# Patient Record
Sex: FEMALE | Race: BLACK OR AFRICAN AMERICAN | URBAN - METROPOLITAN AREA
[De-identification: names, ages, dates, MRNs, and addresses within clinical notes are randomized per-mention and may not be internally consistent; named-entity substitution may affect disease eponyms.]

---

## 2017-12-05 ENCOUNTER — TELEPHONE (OUTPATIENT)
Dept: FAMILY MEDICINE CLINIC | Age: 37
End: 2017-12-05

## 2017-12-05 NOTE — TELEPHONE ENCOUNTER
----- Message from Aletha Kong sent at 12/5/2017 12:53 PM EST -----  Contact: Corie  This patient is looking to become a new pt of Bedelia Buerger. Her mom is a patient of terence. Sharyle Silva . cb patient at  health insurance is [employer] .

## 2018-11-14 ENCOUNTER — OFFICE VISIT (OUTPATIENT)
Dept: PRIMARY CARE CLINIC | Age: 38
End: 2018-11-14

## 2018-11-14 VITALS
DIASTOLIC BLOOD PRESSURE: 86 MMHG | HEIGHT: 62 IN | WEIGHT: 222 LBS | SYSTOLIC BLOOD PRESSURE: 132 MMHG | BODY MASS INDEX: 40.85 KG/M2 | TEMPERATURE: 97.3 F

## 2018-11-14 DIAGNOSIS — R53.83 FATIGUE, UNSPECIFIED TYPE: ICD-10-CM

## 2018-11-14 DIAGNOSIS — R73.09 ELEVATED HEMOGLOBIN A1C: ICD-10-CM

## 2018-11-14 DIAGNOSIS — D72.829 LEUKOCYTOSIS, UNSPECIFIED TYPE: ICD-10-CM

## 2018-11-14 DIAGNOSIS — Z00.00 PHYSICAL EXAM: ICD-10-CM

## 2018-11-14 DIAGNOSIS — E55.9 VITAMIN D DEFICIENCY: ICD-10-CM

## 2018-11-14 DIAGNOSIS — Z23 NEEDS FLU SHOT: ICD-10-CM

## 2018-11-14 DIAGNOSIS — Z00.00 PHYSICAL EXAM: Primary | ICD-10-CM

## 2018-11-14 DIAGNOSIS — E03.9 HYPOTHYROIDISM, UNSPECIFIED TYPE: ICD-10-CM

## 2018-11-14 PROBLEM — E78.5 HYPERLIPIDEMIA: Status: ACTIVE | Noted: 2018-11-14

## 2018-11-14 LAB
A/G RATIO: 1.6 (ref 1.1–2.2)
ALBUMIN SERPL-MCNC: 4.7 G/DL (ref 3.4–5)
ALP BLD-CCNC: 89 U/L (ref 40–129)
ALT SERPL-CCNC: 14 U/L (ref 10–40)
ANION GAP SERPL CALCULATED.3IONS-SCNC: 14 MMOL/L (ref 3–16)
AST SERPL-CCNC: 13 U/L (ref 15–37)
BACTERIA: ABNORMAL /HPF
BASOPHILS ABSOLUTE: 0 K/UL (ref 0–0.2)
BASOPHILS RELATIVE PERCENT: 0.2 %
BILIRUB SERPL-MCNC: <0.2 MG/DL (ref 0–1)
BILIRUBIN URINE: NEGATIVE
BLOOD, URINE: NEGATIVE
BUN BLDV-MCNC: 7 MG/DL (ref 7–20)
CALCIUM SERPL-MCNC: 9.6 MG/DL (ref 8.3–10.6)
CHLORIDE BLD-SCNC: 102 MMOL/L (ref 99–110)
CHOLESTEROL, TOTAL: 203 MG/DL (ref 0–199)
CLARITY: ABNORMAL
CO2: 25 MMOL/L (ref 21–32)
COLOR: YELLOW
CREAT SERPL-MCNC: <0.5 MG/DL (ref 0.6–1.1)
EOSINOPHILS ABSOLUTE: 0.1 K/UL (ref 0–0.6)
EOSINOPHILS RELATIVE PERCENT: 1.2 %
EPITHELIAL CELLS, UA: 7 /HPF (ref 0–5)
FOLATE: 7.99 NG/ML (ref 4.78–24.2)
GFR AFRICAN AMERICAN: >60
GFR NON-AFRICAN AMERICAN: >60
GLOBULIN: 3 G/DL
GLUCOSE BLD-MCNC: 100 MG/DL (ref 70–99)
GLUCOSE URINE: NEGATIVE MG/DL
HCT VFR BLD CALC: 36.5 % (ref 36–48)
HDLC SERPL-MCNC: 37 MG/DL (ref 40–60)
HEMOGLOBIN: 11.5 G/DL (ref 12–16)
HYALINE CASTS: 2 /LPF (ref 0–8)
KETONES, URINE: NEGATIVE MG/DL
LDL CHOLESTEROL CALCULATED: 147 MG/DL
LEUKOCYTE ESTERASE, URINE: NEGATIVE
LYMPHOCYTES ABSOLUTE: 2.7 K/UL (ref 1–5.1)
LYMPHOCYTES RELATIVE PERCENT: 26.2 %
MCH RBC QN AUTO: 26 PG (ref 26–34)
MCHC RBC AUTO-ENTMCNC: 31.4 G/DL (ref 31–36)
MCV RBC AUTO: 82.6 FL (ref 80–100)
MICROSCOPIC EXAMINATION: YES
MONOCYTES ABSOLUTE: 0.7 K/UL (ref 0–1.3)
MONOCYTES RELATIVE PERCENT: 7.3 %
NEUTROPHILS ABSOLUTE: 6.6 K/UL (ref 1.7–7.7)
NEUTROPHILS RELATIVE PERCENT: 65.1 %
NITRITE, URINE: NEGATIVE
PDW BLD-RTO: 18 % (ref 12.4–15.4)
PH UA: 5.5
PLATELET # BLD: 486 K/UL (ref 135–450)
PMV BLD AUTO: 8 FL (ref 5–10.5)
POTASSIUM SERPL-SCNC: 4 MMOL/L (ref 3.5–5.1)
PROTEIN UA: NEGATIVE MG/DL
RBC # BLD: 4.41 M/UL (ref 4–5.2)
RBC UA: 1 /HPF (ref 0–4)
SODIUM BLD-SCNC: 141 MMOL/L (ref 136–145)
SPECIFIC GRAVITY UA: 1.03
T4 FREE: 1 NG/DL (ref 0.9–1.8)
TOTAL PROTEIN: 7.7 G/DL (ref 6.4–8.2)
TRIGL SERPL-MCNC: 96 MG/DL (ref 0–150)
TSH SERPL DL<=0.05 MIU/L-ACNC: 1.53 UIU/ML (ref 0.27–4.2)
URINE TYPE: ABNORMAL
UROBILINOGEN, URINE: 0.2 E.U./DL
VITAMIN B-12: 505 PG/ML (ref 211–911)
VLDLC SERPL CALC-MCNC: 19 MG/DL
WBC # BLD: 10.1 K/UL (ref 4–11)
WBC UA: 2 /HPF (ref 0–5)

## 2018-11-14 PROCEDURE — 90686 IIV4 VACC NO PRSV 0.5 ML IM: CPT | Performed by: INTERNAL MEDICINE

## 2018-11-14 PROCEDURE — 99203 OFFICE O/P NEW LOW 30 MIN: CPT | Performed by: INTERNAL MEDICINE

## 2018-11-14 PROCEDURE — 90471 IMMUNIZATION ADMIN: CPT | Performed by: INTERNAL MEDICINE

## 2018-11-14 RX ORDER — AZITHROMYCIN 250 MG/1
TABLET, FILM COATED ORAL
Qty: 6 TABLET | Refills: 0 | Status: SHIPPED | OUTPATIENT
Start: 2018-11-14 | End: 2018-11-24

## 2018-11-14 ASSESSMENT — ENCOUNTER SYMPTOMS
EYE DISCHARGE: 0
RESPIRATORY NEGATIVE: 1
EYES NEGATIVE: 1

## 2018-11-14 NOTE — PATIENT INSTRUCTIONS
Continue your current medications. See me in one month. Finish the antibiotic. Lab review. Flu shot today.

## 2018-11-15 LAB
ESTIMATED AVERAGE GLUCOSE: 128.4 MG/DL
HBA1C MFR BLD: 6.1 %

## 2018-11-18 LAB
VITAMIN D2 AND D3, TOTAL: 17.4 NG/ML (ref 30–80)
VITAMIN D2, 25 HYDROXY: 6.5 NG/ML
VITAMIN D3,25 HYDROXY: 10.9 NG/ML

## 2018-12-12 ENCOUNTER — OFFICE VISIT (OUTPATIENT)
Dept: PRIMARY CARE CLINIC | Age: 38
End: 2018-12-12

## 2018-12-12 VITALS
HEART RATE: 77 BPM | BODY MASS INDEX: 41.22 KG/M2 | TEMPERATURE: 98 F | HEIGHT: 62 IN | SYSTOLIC BLOOD PRESSURE: 121 MMHG | DIASTOLIC BLOOD PRESSURE: 74 MMHG | WEIGHT: 224 LBS | RESPIRATION RATE: 16 BRPM | OXYGEN SATURATION: 99 %
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
## 2018-12-12 DIAGNOSIS — R73.09 ELEVATED HEMOGLOBIN A1C: ICD-10-CM

## 2018-12-12 DIAGNOSIS — E55.9 VITAMIN D DEFICIENCY: Primary | ICD-10-CM

## 2018-12-12 DIAGNOSIS — E78.5 HYPERLIPIDEMIA, UNSPECIFIED HYPERLIPIDEMIA TYPE: ICD-10-CM

## 2018-12-12 PROCEDURE — 99214 OFFICE O/P EST MOD 30 MIN: CPT | Performed by: INTERNAL MEDICINE

## 2018-12-12 RX ORDER — ERGOCALCIFEROL 1.25 MG/1
50000 CAPSULE ORAL WEEKLY
Qty: 30 CAPSULE | Refills: 0 | Status: SHIPPED | OUTPATIENT
Start: 2018-12-12 | End: 2020-02-04 | Stop reason: SDUPTHER

## 2018-12-12 ASSESSMENT — PATIENT HEALTH QUESTIONNAIRE - PHQ9
1. LITTLE INTEREST OR PLEASURE IN DOING THINGS: 0
SUM OF ALL RESPONSES TO PHQ QUESTIONS 1-9: 0
SUM OF ALL RESPONSES TO PHQ9 QUESTIONS 1 & 2: 0
SUM OF ALL RESPONSES TO PHQ QUESTIONS 1-9: 0
2. FEELING DOWN, DEPRESSED OR HOPELESS: 0

## 2018-12-12 ASSESSMENT — ENCOUNTER SYMPTOMS
EYE DISCHARGE: 0
EYES NEGATIVE: 1
RESPIRATORY NEGATIVE: 1

## 2018-12-12 NOTE — PROGRESS NOTES
disease include dyslipidemia and diabetes mellitus. She is compliant with treatment all of the time. Her weight is decreasing steadily. Meal planning includes avoidance of concentrated sweets and carbohydrate counting. She participates in exercise intermittently. An ACE inhibitor/angiotensin II receptor blocker is not being taken. She does not see a podiatrist.Eye exam is not current. Allergies   Allergen Reactions    Latex Rash     Outpatient Prescriptions Marked as Taking for the 12/12/18 encounter (Office Visit) with Reymundo Mcclelland MD   Medication Sig Dispense Refill    vitamin D (ERGOCALCIFEROL) 38461 units CAPS capsule Take 1 capsule by mouth once a week 30 capsule 0    Omeprazole Magnesium (PRILOSEC OTC PO) Take  by mouth.  Multiple Vitamins-Minerals (THERAPEUTIC MULTIVITAMIN-MINERALS) tablet Take 1 tablet by mouth daily.  Fish Oil OIL by Does not apply route. Immunization History   Administered Date(s) Administered    Influenza, Quadv, 3 yrs and older, IM, PF (Fluzone 3 yrs and older or Afluria 5 yrs and older) 11/14/2018    Tdap (Boostrix, Adacel) 11/14/2013       History reviewed. No pertinent past medical history. Past Surgical History:   Procedure Laterality Date    TUBAL LIGATION  2007     Family History   Problem Relation Age of Onset    High Blood Pressure Mother     Diabetes Paternal Aunt     Diabetes Paternal Uncle     Heart Disease Maternal Grandmother     Heart Disease Maternal Grandfather     Diabetes Paternal Grandfather        Review of Systems:  Review of Systems   Constitutional: Negative for activity change and appetite change. HENT: Negative. Eyes: Negative. Negative for discharge. Respiratory: Negative. Genitourinary: Negative for difficulty urinating. Musculoskeletal: Negative for arthralgias. Skin: Negative for rash. Neurological: Negative. Psychiatric/Behavioral: Negative. Negative for agitation and confusion.  The patient

## 2018-12-12 NOTE — PATIENT INSTRUCTIONS
Continue your current medications and see me again in 3 months. Please have your labs repeated before the next visit. Continue Ibuprofen for the left knee pain. Lipid panel in 3 months. Vitamin D level in 3 months. Repeat the A1c in 3 months.

## 2018-12-14 PROBLEM — Z00.00 PHYSICAL EXAM: Status: RESOLVED | Noted: 2018-11-14 | Resolved: 2018-12-14

## 2020-01-15 ENCOUNTER — OFFICE VISIT (OUTPATIENT)
Dept: PRIMARY CARE CLINIC | Age: 40
End: 2020-01-15
Payer: COMMERCIAL

## 2020-01-15 VITALS
HEART RATE: 78 BPM | TEMPERATURE: 98.2 F | WEIGHT: 232 LBS | HEIGHT: 62 IN | BODY MASS INDEX: 42.69 KG/M2 | SYSTOLIC BLOOD PRESSURE: 135 MMHG | OXYGEN SATURATION: 98 % | DIASTOLIC BLOOD PRESSURE: 84 MMHG

## 2020-01-15 PROBLEM — Z23 NEED FOR SHINGLES VACCINE: Status: ACTIVE | Noted: 2020-01-15

## 2020-01-15 LAB
A/G RATIO: 1.4 (ref 1.1–2.2)
ALBUMIN SERPL-MCNC: 4.3 G/DL (ref 3.4–5)
ALP BLD-CCNC: 87 U/L (ref 40–129)
ALT SERPL-CCNC: 18 U/L (ref 10–40)
ANION GAP SERPL CALCULATED.3IONS-SCNC: 16 MMOL/L (ref 3–16)
AST SERPL-CCNC: 17 U/L (ref 15–37)
BASOPHILS ABSOLUTE: 0.1 K/UL (ref 0–0.2)
BASOPHILS RELATIVE PERCENT: 1.2 %
BILIRUB SERPL-MCNC: <0.2 MG/DL (ref 0–1)
BUN BLDV-MCNC: 8 MG/DL (ref 7–20)
CALCIUM SERPL-MCNC: 9.5 MG/DL (ref 8.3–10.6)
CHLORIDE BLD-SCNC: 102 MMOL/L (ref 99–110)
CHOLESTEROL, TOTAL: 208 MG/DL (ref 0–199)
CO2: 24 MMOL/L (ref 21–32)
CREAT SERPL-MCNC: <0.5 MG/DL (ref 0.6–1.1)
EOSINOPHILS ABSOLUTE: 0.1 K/UL (ref 0–0.6)
EOSINOPHILS RELATIVE PERCENT: 1.7 %
GFR AFRICAN AMERICAN: >60
GFR NON-AFRICAN AMERICAN: >60
GLOBULIN: 3 G/DL
GLUCOSE BLD-MCNC: 101 MG/DL (ref 70–99)
HCT VFR BLD CALC: 37.4 % (ref 36–48)
HDLC SERPL-MCNC: 37 MG/DL (ref 40–60)
HEMOGLOBIN: 12 G/DL (ref 12–16)
LDL CHOLESTEROL CALCULATED: 139 MG/DL
LYMPHOCYTES ABSOLUTE: 2.6 K/UL (ref 1–5.1)
LYMPHOCYTES RELATIVE PERCENT: 29.4 %
MCH RBC QN AUTO: 26.7 PG (ref 26–34)
MCHC RBC AUTO-ENTMCNC: 32 G/DL (ref 31–36)
MCV RBC AUTO: 83.3 FL (ref 80–100)
MONOCYTES ABSOLUTE: 0.7 K/UL (ref 0–1.3)
MONOCYTES RELATIVE PERCENT: 8.2 %
NEUTROPHILS ABSOLUTE: 5.2 K/UL (ref 1.7–7.7)
NEUTROPHILS RELATIVE PERCENT: 59.5 %
PDW BLD-RTO: 16.7 % (ref 12.4–15.4)
PLATELET # BLD: 458 K/UL (ref 135–450)
PMV BLD AUTO: 7.9 FL (ref 5–10.5)
POTASSIUM SERPL-SCNC: 4.4 MMOL/L (ref 3.5–5.1)
RBC # BLD: 4.49 M/UL (ref 4–5.2)
SODIUM BLD-SCNC: 142 MMOL/L (ref 136–145)
TOTAL PROTEIN: 7.3 G/DL (ref 6.4–8.2)
TRIGL SERPL-MCNC: 162 MG/DL (ref 0–150)
TSH SERPL DL<=0.05 MIU/L-ACNC: 1.58 UIU/ML (ref 0.27–4.2)
VITAMIN D 25-HYDROXY: 19.1 NG/ML
VLDLC SERPL CALC-MCNC: 32 MG/DL
WBC # BLD: 8.8 K/UL (ref 4–11)

## 2020-01-15 PROCEDURE — 99214 OFFICE O/P EST MOD 30 MIN: CPT | Performed by: INTERNAL MEDICINE

## 2020-01-15 ASSESSMENT — PATIENT HEALTH QUESTIONNAIRE - PHQ9
2. FEELING DOWN, DEPRESSED OR HOPELESS: 0
1. LITTLE INTEREST OR PLEASURE IN DOING THINGS: 0
SUM OF ALL RESPONSES TO PHQ9 QUESTIONS 1 & 2: 0
SUM OF ALL RESPONSES TO PHQ QUESTIONS 1-9: 0
SUM OF ALL RESPONSES TO PHQ QUESTIONS 1-9: 0

## 2020-01-15 ASSESSMENT — ENCOUNTER SYMPTOMS
GASTROINTESTINAL NEGATIVE: 1
RESPIRATORY NEGATIVE: 1
EYES NEGATIVE: 1

## 2020-01-15 NOTE — PATIENT INSTRUCTIONS
Lab review. Start on a diet. Get some exercise. Continue your current medications. Patient Education        DASH Diet: Care Instructions  Your Care Instructions    The DASH diet is an eating plan that can help lower your blood pressure. DASH stands for Dietary Approaches to Stop Hypertension. Hypertension is high blood pressure. The DASH diet focuses on eating foods that are high in calcium, potassium, and magnesium. These nutrients can lower blood pressure. The foods that are highest in these nutrients are fruits, vegetables, low-fat dairy products, nuts, seeds, and legumes. But taking calcium, potassium, and magnesium supplements instead of eating foods that are high in those nutrients does not have the same effect. The DASH diet also includes whole grains, fish, and poultry. The DASH diet is one of several lifestyle changes your doctor may recommend to lower your high blood pressure. Your doctor may also want you to decrease the amount of sodium in your diet. Lowering sodium while following the DASH diet can lower blood pressure even further than just the DASH diet alone. Follow-up care is a key part of your treatment and safety. Be sure to make and go to all appointments, and call your doctor if you are having problems. It's also a good idea to know your test results and keep a list of the medicines you take. How can you care for yourself at home? Following the DASH diet  · Eat 4 to 5 servings of fruit each day. A serving is 1 medium-sized piece of fruit, ½ cup chopped or canned fruit, 1/4 cup dried fruit, or 4 ounces (½ cup) of fruit juice. Choose fruit more often than fruit juice. · Eat 4 to 5 servings of vegetables each day. A serving is 1 cup of lettuce or raw leafy vegetables, ½ cup of chopped or cooked vegetables, or 4 ounces (½ cup) of vegetable juice. Choose vegetables more often than vegetable juice. · Get 2 to 3 servings of low-fat and fat-free dairy each day.  A serving is 8 ounces of milk, 1 adding chopped walnuts or almonds to cooked vegetables. ? Try some vegetarian meals using beans and peas. Add garbanzo or kidney beans to salads. Make burritos and tacos with mashed cao beans or black beans. Where can you learn more? Go to https://chpepiceweb.healthInLive Interactive. org and sign in to your World Blender account. Enter I959 in the [employer] box to learn more about \"DASH Diet: Care Instructions. \"     If you do not have an account, please click on the \"Sign Up Now\" link. Current as of: April 9, 2019  Content Version: 12.3  © 7981-4723 [employer]. Care instructions adapted under license by [facility] ([facility]). If you have questions about a medical condition or this instruction, always ask your healthcare professional. [place] [address] any warranty or liability for your use of this information. Patient Education        Learning About Physical Activity  What is physical activity? Physical activity is any kind of activity that gets your body moving. The types of physical activity that can help you get fit and stay healthy include:  · Aerobic or \"cardio\" activities that make your heart beat faster and make you breathe harder, such as brisk walking, riding a bike, or running. Aerobic activities strengthen your heart and lungs and build up your endurance. · Strength training activities that make your muscles work against, or \"resist,\" something, such as lifting weights or doing push-ups. These activities help tone and strengthen your muscles. · Stretches that allow you to move your joints and muscles through their full range of motion. Stretching helps you be more flexible and avoid injury. What are the benefits of physical activity? Being active is one of the best things you can do to get fit and stay healthy. It helps you to:  · Feel stronger and have more energy to do all the things you like to do.   · Focus better at school or work and perform better in flexible, loosen tight muscles, and avoid injury. It can also help improve your balance and posture and can be a great way to relax. Be sure to stretch the muscles you'll be using when you work out. It's best to warm your muscles slightly before you stretch them. Walk or do some other light aerobic activity for a few minutes, and then start stretching. When you stretch your muscles:  · Do it slowly. Stretching is not about going fast or making sudden movements. · Don't push or bounce during a stretch. · Hold each stretch for at least 15 to 30 seconds, if you can. You should feel a stretch in the muscle, but not pain. · Breathe out as you do the stretch. Then breathe in as you hold the stretch. Don't hold your breath. If you're worried about how more activity might affect your health, have a checkup before you start. Follow any special advice your doctor gives you for getting a smart start. Where can you learn more? Go to https://[employer] Capital.FlagTap. org and sign in to your Digital Performance account. Enter Y895 in the Videoflow box to learn more about \"Learning About Physical Activity. \"     If you do not have an account, please click on the \"Sign Up Now\" link. Current as of: May 5, 2019  Content Version: 12.3  © 3151-1129 [employer]wise[employer] Incorporated. Care instructions adapted under license by [facility] ([facility]). If you have questions about a medical condition or this instruction, always ask your healthcare professional. [place] [address] any warranty or liability for your use of this information.

## 2020-01-16 LAB
ESTIMATED AVERAGE GLUCOSE: 119.8 MG/DL
HBA1C MFR BLD: 5.8 %

## 2020-01-22 ENCOUNTER — TELEPHONE (OUTPATIENT)
Dept: PRIMARY CARE CLINIC | Age: 40
End: 2020-01-22

## 2020-02-04 RX ORDER — ATORVASTATIN CALCIUM 40 MG/1
40 TABLET, FILM COATED ORAL DAILY
Qty: 90 TABLET | Refills: 1 | Status: SHIPPED | OUTPATIENT
Start: 2020-02-04 | End: 2022-03-09 | Stop reason: SDUPTHER

## 2020-02-04 RX ORDER — ERGOCALCIFEROL 1.25 MG/1
CAPSULE ORAL
Qty: 60 CAPSULE | Refills: 2 | Status: SHIPPED | OUTPATIENT
Start: 2020-02-04 | End: 2022-10-26

## 2020-02-04 NOTE — TELEPHONE ENCOUNTER
The patient was called and given her lab results. The vitamin D was increased to 1 tablet twice a week because the patient states that she has been on the medication once a week for over a year and still has a very low vitamin D level. Her cholesterol is also elevated so I am going to start her on a atorvastatin 40 mg daily. Follow-up as previously planned and repeat vitamin D level and cholesterol in 3 months.
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
## 2022-03-09 ENCOUNTER — OFFICE VISIT (OUTPATIENT)
Dept: PRIMARY CARE CLINIC | Age: 42
End: 2022-03-09
Payer: COMMERCIAL

## 2022-03-09 ENCOUNTER — TELEPHONE (OUTPATIENT)
Dept: PRIMARY CARE CLINIC | Age: 42
End: 2022-03-09

## 2022-03-09 VITALS
HEART RATE: 71 BPM | HEIGHT: 62 IN | SYSTOLIC BLOOD PRESSURE: 115 MMHG | BODY MASS INDEX: 43.24 KG/M2 | WEIGHT: 235 LBS | TEMPERATURE: 97.3 F | DIASTOLIC BLOOD PRESSURE: 83 MMHG

## 2022-03-09 DIAGNOSIS — R73.09 ELEVATED HEMOGLOBIN A1C: ICD-10-CM

## 2022-03-09 DIAGNOSIS — E78.5 HYPERLIPIDEMIA, UNSPECIFIED HYPERLIPIDEMIA TYPE: ICD-10-CM

## 2022-03-09 DIAGNOSIS — R53.83 FATIGUE, UNSPECIFIED TYPE: ICD-10-CM

## 2022-03-09 DIAGNOSIS — E03.9 HYPOTHYROIDISM, UNSPECIFIED TYPE: ICD-10-CM

## 2022-03-09 DIAGNOSIS — Z12.4 SCREENING FOR CERVICAL CANCER: Primary | ICD-10-CM

## 2022-03-09 DIAGNOSIS — H00.011 HORDEOLUM EXTERNUM OF RIGHT UPPER EYELID: ICD-10-CM

## 2022-03-09 LAB
A/G RATIO: 1.3 (ref 1.1–2.2)
ALBUMIN SERPL-MCNC: 4.2 G/DL (ref 3.4–5)
ALP BLD-CCNC: 90 U/L (ref 40–129)
ALT SERPL-CCNC: 12 U/L (ref 10–40)
ANION GAP SERPL CALCULATED.3IONS-SCNC: 15 MMOL/L (ref 3–16)
AST SERPL-CCNC: 12 U/L (ref 15–37)
BILIRUB SERPL-MCNC: <0.2 MG/DL (ref 0–1)
BUN BLDV-MCNC: 7 MG/DL (ref 7–20)
CALCIUM SERPL-MCNC: 9.3 MG/DL (ref 8.3–10.6)
CHLORIDE BLD-SCNC: 102 MMOL/L (ref 99–110)
CHOLESTEROL, FASTING: 228 MG/DL (ref 0–199)
CO2: 22 MMOL/L (ref 21–32)
CREAT SERPL-MCNC: 0.6 MG/DL (ref 0.6–1.1)
GFR AFRICAN AMERICAN: >60
GFR NON-AFRICAN AMERICAN: >60
GLUCOSE BLD-MCNC: 105 MG/DL (ref 70–99)
HBA1C MFR BLD: 5.9 %
HDLC SERPL-MCNC: 38 MG/DL (ref 40–60)
LDL CHOLESTEROL CALCULATED: 162 MG/DL
POTASSIUM SERPL-SCNC: 4.3 MMOL/L (ref 3.5–5.1)
SODIUM BLD-SCNC: 139 MMOL/L (ref 136–145)
TOTAL PROTEIN: 7.4 G/DL (ref 6.4–8.2)
TRIGLYCERIDE, FASTING: 142 MG/DL (ref 0–150)
TSH REFLEX FT4: 1.89 UIU/ML (ref 0.27–4.2)
VLDLC SERPL CALC-MCNC: 28 MG/DL

## 2022-03-09 PROCEDURE — 99215 OFFICE O/P EST HI 40 MIN: CPT | Performed by: INTERNAL MEDICINE

## 2022-03-09 PROCEDURE — 83036 HEMOGLOBIN GLYCOSYLATED A1C: CPT | Performed by: INTERNAL MEDICINE

## 2022-03-09 RX ORDER — ATORVASTATIN CALCIUM 40 MG/1
40 TABLET, FILM COATED ORAL DAILY
Qty: 90 TABLET | Refills: 1 | Status: SHIPPED | OUTPATIENT
Start: 2022-03-09 | End: 2022-10-26 | Stop reason: SDUPTHER

## 2022-03-09 SDOH — ECONOMIC STABILITY: FOOD INSECURITY: WITHIN THE PAST 12 MONTHS, THE FOOD YOU BOUGHT JUST DIDN'T LAST AND YOU DIDN'T HAVE MONEY TO GET MORE.: NEVER TRUE

## 2022-03-09 SDOH — ECONOMIC STABILITY: FOOD INSECURITY: WITHIN THE PAST 12 MONTHS, YOU WORRIED THAT YOUR FOOD WOULD RUN OUT BEFORE YOU GOT MONEY TO BUY MORE.: NEVER TRUE

## 2022-03-09 ASSESSMENT — PATIENT HEALTH QUESTIONNAIRE - PHQ9
SUM OF ALL RESPONSES TO PHQ QUESTIONS 1-9: 0
2. FEELING DOWN, DEPRESSED OR HOPELESS: 0
SUM OF ALL RESPONSES TO PHQ QUESTIONS 1-9: 0
SUM OF ALL RESPONSES TO PHQ9 QUESTIONS 1 & 2: 0
1. LITTLE INTEREST OR PLEASURE IN DOING THINGS: 0
SUM OF ALL RESPONSES TO PHQ QUESTIONS 1-9: 0
SUM OF ALL RESPONSES TO PHQ QUESTIONS 1-9: 0

## 2022-03-09 ASSESSMENT — SOCIAL DETERMINANTS OF HEALTH (SDOH): HOW HARD IS IT FOR YOU TO PAY FOR THE VERY BASICS LIKE FOOD, HOUSING, MEDICAL CARE, AND HEATING?: NOT HARD AT ALL

## 2022-03-09 ASSESSMENT — ENCOUNTER SYMPTOMS
EYE DISCHARGE: 0
RESPIRATORY NEGATIVE: 1
EYES NEGATIVE: 1

## 2022-03-09 NOTE — TELEPHONE ENCOUNTER
Pt called in about concerns about her visit today  and she stated that her paper work stated that she is a diabetic she is concern because she not being treated for this Pt would like some clarity on this please contact Pt @[phone] please be advise

## 2022-03-09 NOTE — PATIENT INSTRUCTIONS
Your cholesterol was elevated the last time it was checked. Please go ahead and start on the atorvastatin to help lower your cholesterol. A prescription was sent to the pharmacy. Lab review to check your cholesterol and other lab values. Continue your current medications. Your A1c was very good today    I have included a referral to an ophthalmologist to check the stye on your right upper lid. You also have an appointment scheduled with the gynecologist.    I agree with your diet changes. Please continue with the diet that you have selected and continue weight watchers. See me again in 3 months.

## 2022-03-09 NOTE — PROGRESS NOTES
Napoleon Juares (:  1980) is a 37 y.o. female,Established patient, here for evaluation of the following chief complaint(s): Annual Exam         ASSESSMENT/PLAN:  1. Elevated hemoglobin A1c  Assessment & Plan:   Asymptomatic, continue current medications  Orders:  -     POCT glycosylated hemoglobin (Hb A1C)  2. Screening for cervical cancer  -     Debbie Mays MD, Gynecology, [address] [place]  3. Hypothyroidism, unspecified type  -     TSH with Reflex to FT4; Future  4. Hyperlipidemia, unspecified hyperlipidemia type  Assessment & Plan:   Uncontrolled, continue current medications  Orders:  -     Lipid, Fasting; Future  -     atorvastatin (LIPITOR) 40 MG tablet; Take 1 tablet by mouth daily, Disp-90 tablet, R-1Normal  -     Comprehensive Metabolic Panel; Future  5. Hordeolum externum of right upper eyelid  Assessment & Plan:  Her to ophthalmology because of cosmetic issues regarding the upper lid for delirium which is not bothering her. Orders:  -     Wilberto Lee MD, (Anterior Segment Reconstruction, Comprehensive and Surgical Ophthalmology) Ophthalmology, [place]  6. Fatigue, unspecified type  Assessment & Plan:   Well-controlled, continue current medications      Return in about 3 months (around 2022). Subjective   SUBJECTIVE/OBJECTIVE:  HPI    Review of Systems   Constitutional: Negative for activity change and appetite change. HENT: Negative. Eyes: Negative. Negative for discharge. Respiratory: Negative. Genitourinary: Negative for difficulty urinating. Musculoskeletal: Negative for arthralgias. Skin: Negative for rash. Neurological: Negative. Psychiatric/Behavioral: Negative. Negative for agitation and confusion. The patient is not nervous/anxious. All other systems reviewed and are negative. Objective   Physical Exam  Constitutional:       Appearance: She is well-developed. HENT:      Head: Normocephalic and atraumatic.    Eyes: Conjunctiva/sclera: Conjunctivae normal.      Pupils: Pupils are equal, round, and reactive to light. Cardiovascular:      Rate and Rhythm: Normal rate and regular rhythm. Heart sounds: Normal heart sounds. Pulmonary:      Effort: Pulmonary effort is normal.      Breath sounds: Normal breath sounds. Musculoskeletal:         General: Normal range of motion. Cervical back: Normal range of motion and neck supple. Skin:     General: Skin is warm and dry. Neurological:      Mental Status: She is alert and oriented to person, place, and time. Deep Tendon Reflexes: Reflexes are normal and symmetric. Hordeolum externum of right upper eyelid  Her to ophthalmology because of cosmetic issues regarding the upper lid for delirium which is not bothering her. Elevated hemoglobin A1c   Asymptomatic, continue current medications    Fatigue   Well-controlled, continue current medications    Hyperlipidemia   Uncontrolled, continue current medications       On this date 3/9/2022 I have spent 40 minutes reviewing previous notes, test results and face to face with the patient discussing the diagnosis and importance of compliance with the treatment plan as well as documenting on the day of the visit. An electronic signature was used to authenticate this note.     --Mazin Chaudhari MD

## 2022-03-09 NOTE — ASSESSMENT & PLAN NOTE
Her to ophthalmology because of cosmetic issues regarding the upper lid for delirium which is not bothering her.

## 2022-03-10 NOTE — TELEPHONE ENCOUNTER
You have an elevated hemoglobin A1c that goes back at least 2 years. That is being followed to see if you eventually develop diabetes. You do not have diabetes now.

## 2022-04-08 PROBLEM — Z12.4 SCREENING FOR CERVICAL CANCER: Status: RESOLVED | Noted: 2022-03-09 | Resolved: 2022-04-08

## 2022-05-26 PROBLEM — D06.9 CIN III WITH SEVERE DYSPLASIA: Status: ACTIVE | Noted: 2021-06-23

## 2022-10-26 ENCOUNTER — OFFICE VISIT (OUTPATIENT)
Dept: PRIMARY CARE CLINIC | Age: 42
End: 2022-10-26
Payer: COMMERCIAL

## 2022-10-26 ENCOUNTER — TELEPHONE (OUTPATIENT)
Dept: PRIMARY CARE CLINIC | Age: 42
End: 2022-10-26

## 2022-10-26 VITALS
DIASTOLIC BLOOD PRESSURE: 84 MMHG | SYSTOLIC BLOOD PRESSURE: 130 MMHG | HEART RATE: 86 BPM | WEIGHT: 244.9 LBS | BODY MASS INDEX: 44.79 KG/M2 | OXYGEN SATURATION: 98 %

## 2022-10-26 DIAGNOSIS — E78.5 HYPERLIPIDEMIA, UNSPECIFIED HYPERLIPIDEMIA TYPE: ICD-10-CM

## 2022-10-26 DIAGNOSIS — E55.9 VITAMIN D DEFICIENCY: Primary | ICD-10-CM

## 2022-10-26 DIAGNOSIS — E55.9 VITAMIN D DEFICIENCY: ICD-10-CM

## 2022-10-26 DIAGNOSIS — R73.03 PRE-DIABETES: ICD-10-CM

## 2022-10-26 DIAGNOSIS — H00.011 HORDEOLUM EXTERNUM OF RIGHT UPPER EYELID: ICD-10-CM

## 2022-10-26 DIAGNOSIS — R73.09 ELEVATED HEMOGLOBIN A1C: ICD-10-CM

## 2022-10-26 LAB
A/G RATIO: 1.4 (ref 1.1–2.2)
ALBUMIN SERPL-MCNC: 4.1 G/DL (ref 3.4–5)
ALP BLD-CCNC: 88 U/L (ref 40–129)
ALT SERPL-CCNC: 15 U/L (ref 10–40)
ANION GAP SERPL CALCULATED.3IONS-SCNC: 14 MMOL/L (ref 3–16)
AST SERPL-CCNC: 13 U/L (ref 15–37)
BASOPHILS ABSOLUTE: 0.1 K/UL (ref 0–0.2)
BASOPHILS RELATIVE PERCENT: 1 %
BILIRUB SERPL-MCNC: <0.2 MG/DL (ref 0–1)
BILIRUBIN URINE: NEGATIVE
BLOOD, URINE: NEGATIVE
BUN BLDV-MCNC: 7 MG/DL (ref 7–20)
CALCIUM SERPL-MCNC: 9.2 MG/DL (ref 8.3–10.6)
CHLORIDE BLD-SCNC: 101 MMOL/L (ref 99–110)
CHOLESTEROL, FASTING: 200 MG/DL (ref 0–199)
CLARITY: CLEAR
CO2: 22 MMOL/L (ref 21–32)
COLOR: YELLOW
CREAT SERPL-MCNC: 0.6 MG/DL (ref 0.6–1.1)
EOSINOPHILS ABSOLUTE: 0.2 K/UL (ref 0–0.6)
EOSINOPHILS RELATIVE PERCENT: 1.5 %
GFR SERPL CREATININE-BSD FRML MDRD: >60 ML/MIN/{1.73_M2}
GLUCOSE BLD-MCNC: 96 MG/DL (ref 70–99)
GLUCOSE URINE: NEGATIVE MG/DL
HCT VFR BLD CALC: 33.6 % (ref 36–48)
HDLC SERPL-MCNC: 39 MG/DL (ref 40–60)
HEMOGLOBIN: 10.7 G/DL (ref 12–16)
KETONES, URINE: NEGATIVE MG/DL
LDL CHOLESTEROL CALCULATED: 127 MG/DL
LEUKOCYTE ESTERASE, URINE: NEGATIVE
LYMPHOCYTES ABSOLUTE: 3.1 K/UL (ref 1–5.1)
LYMPHOCYTES RELATIVE PERCENT: 28.4 %
MCH RBC QN AUTO: 25.2 PG (ref 26–34)
MCHC RBC AUTO-ENTMCNC: 31.7 G/DL (ref 31–36)
MCV RBC AUTO: 79.3 FL (ref 80–100)
MICROSCOPIC EXAMINATION: NORMAL
MONOCYTES ABSOLUTE: 0.8 K/UL (ref 0–1.3)
MONOCYTES RELATIVE PERCENT: 7 %
NEUTROPHILS ABSOLUTE: 6.8 K/UL (ref 1.7–7.7)
NEUTROPHILS RELATIVE PERCENT: 62.1 %
NITRITE, URINE: NEGATIVE
PDW BLD-RTO: 16.9 % (ref 12.4–15.4)
PH UA: 5 (ref 5–8)
PLATELET # BLD: 469 K/UL (ref 135–450)
PMV BLD AUTO: 7.2 FL (ref 5–10.5)
POTASSIUM SERPL-SCNC: 4.1 MMOL/L (ref 3.5–5.1)
PROTEIN UA: NEGATIVE MG/DL
RBC # BLD: 4.24 M/UL (ref 4–5.2)
SODIUM BLD-SCNC: 137 MMOL/L (ref 136–145)
SPECIFIC GRAVITY UA: 1.02 (ref 1–1.03)
TOTAL PROTEIN: 7 G/DL (ref 6.4–8.2)
TRIGLYCERIDE, FASTING: 172 MG/DL (ref 0–150)
TSH REFLEX FT4: 1.76 UIU/ML (ref 0.27–4.2)
URINE TYPE: NORMAL
UROBILINOGEN, URINE: 0.2 E.U./DL
VITAMIN D 25-HYDROXY: 20.9 NG/ML
VLDLC SERPL CALC-MCNC: 34 MG/DL
WBC # BLD: 10.9 K/UL (ref 4–11)

## 2022-10-26 PROCEDURE — 99215 OFFICE O/P EST HI 40 MIN: CPT | Performed by: INTERNAL MEDICINE

## 2022-10-26 RX ORDER — ATORVASTATIN CALCIUM 40 MG/1
40 TABLET, FILM COATED ORAL DAILY
Qty: 90 TABLET | Refills: 1 | Status: SHIPPED | OUTPATIENT
Start: 2022-10-26

## 2022-10-26 RX ORDER — ERGOCALCIFEROL 1.25 MG/1
50000 CAPSULE ORAL WEEKLY
Qty: 12 CAPSULE | Refills: 4 | Status: SHIPPED | OUTPATIENT
Start: 2022-10-26 | End: 2023-10-27

## 2022-10-26 RX ORDER — TIRZEPATIDE 2.5 MG/.5ML
2.5 INJECTION, SOLUTION SUBCUTANEOUS WEEKLY
Qty: 1 ML | Refills: 0 | Status: SHIPPED | OUTPATIENT
Start: 2022-10-26 | End: 2022-11-09

## 2022-10-26 RX ORDER — SULFACETAMIDE SODIUM 100 MG/ML
2 SOLUTION/ DROPS OPHTHALMIC 4 TIMES DAILY
Qty: 10 ML | Refills: 0 | Status: SHIPPED | OUTPATIENT
Start: 2022-10-26 | End: 2022-11-05

## 2022-10-26 ASSESSMENT — ENCOUNTER SYMPTOMS
RESPIRATORY NEGATIVE: 1
EYES NEGATIVE: 1
GASTROINTESTINAL NEGATIVE: 1

## 2022-10-26 NOTE — TELEPHONE ENCOUNTER
Gi LEIVA/rep with [employer] stated that Tirzepatide [facility]) 2.5 MG/0.5ML SOPN SC injection will received 4 doses because it's how it is packaged.

## 2022-10-26 NOTE — PATIENT INSTRUCTIONS
Sulamyd antibiotic eyedrops have been prescribed for the upper lid right eye stye. The prescription has been sent to the pharmacy. Please use it as directed. Lab review as discussed to be done this morning in our lab. Mounjaro subcutaneously as directed once a week for the next 2 weeks. You may give yourself the first dose today. This is for prediabetes. I am also referring you to weight management to help with weight loss plans. Please resume a atorvastatin once a day. A prescription renewal has been sent to the pharmacy. Please see me again in 2 weeks.

## 2022-10-27 NOTE — PROGRESS NOTES
Concerned about possible diabetes as cause of recent symptoms. Can't lose weight and wants Mounjaro to help lose weight and for pre diabetes control    Abigail Paget  YOB: 1980    Date of Service:  10/26/2022    Chief Complaint   Patient presents with    Check-Up     HLD vitamin D def         Diabetes  She presents for her initial diabetic visit. She has type 2 diabetes mellitus. Her disease course has been stable. Symptoms are worsening. There are no diabetic complications. Risk factors for coronary artery disease include dyslipidemia, stress and obesity. When asked about current treatments, none were reported. Hyperlipidemia  This is a chronic problem. The current episode started more than 1 year ago. The problem is uncontrolled. Recent lipid tests were reviewed and are high. Allergies   Allergen Reactions    Latex Rash     Outpatient Medications Marked as Taking for the 10/26/22 encounter (Office Visit) with Jess Patterson MD   Medication Sig Dispense Refill    Tirzepatide [facility]) 2.5 MG/0.5ML SOPN SC injection Inject 0.5 mLs into the skin once a week for 14 days 1 mL 0    vitamin D (ERGOCALCIFEROL) 1.25 MG (01394 UT) CAPS capsule Take 1 capsule by mouth once a week 12 capsule 4    sulfacetamide (BLEPH-10) 10 % ophthalmic solution Place 2 drops into the right eye 4 times daily for 10 days 10 mL 0    atorvastatin (LIPITOR) 40 MG tablet Take 1 tablet by mouth daily 90 tablet 1    Multiple Vitamins-Minerals (THERAPEUTIC MULTIVITAMIN-MINERALS) tablet Take 1 tablet by mouth daily. Fish Oil OIL by Does not apply route         Immunization History   Administered Date(s) Administered    Influenza, FLUARIX, FLULAVAL, FLUZONE (age 10 mo+) AND AFLURIA, (age 1 y+), PF, 0.5mL 11/14/2018    Rubella 05/19/2008    Tdap (Boostrix, Adacel) 11/14/2013       No past medical history on file.   Past Surgical History:   Procedure Laterality Date    TUBAL LIGATION  2007     Family History   Problem Relation Age of Onset    High Blood Pressure Mother     Diabetes Paternal Aunt     Diabetes Paternal Uncle     Heart Disease Maternal Grandmother     Heart Disease Maternal Grandfather     Diabetes Paternal Grandfather        Review of Systems:  Review of Systems   Constitutional: Negative. HENT: Negative. Eyes: Negative. Respiratory: Negative. Cardiovascular: Negative. Gastrointestinal: Negative. Genitourinary: Negative. Musculoskeletal: Negative. Skin: Negative. Neurological: Negative. Psychiatric/Behavioral: Negative. Vitals:    10/26/22 0925   BP: 130/84   Pulse: 86   SpO2: 98%   Weight: 244 lb 14.4 oz (111.1 kg)     Body mass index is 44.79 kg/m². Wt Readings from Last 3 Encounters:   10/26/22 244 lb 14.4 oz (111.1 kg)   03/09/22 235 lb (106.6 kg)   01/15/20 232 lb (105.2 kg)     BP Readings from Last 3 Encounters:   10/26/22 130/84   03/09/22 115/83   01/15/20 135/84         Physical Exam  Constitutional:       Appearance: Normal appearance. She is well-developed. HENT:      Head: Normocephalic and atraumatic. Eyes:      Conjunctiva/sclera: Conjunctivae normal.      Pupils: Pupils are equal, round, and reactive to light. Neck:      Trachea: Trachea and phonation normal.        Comments: Enlarged right lobe of thyroid  Cardiovascular:      Rate and Rhythm: Normal rate and regular rhythm. Pulses: Normal pulses. Heart sounds: Normal heart sounds. Pulmonary:      Effort: Pulmonary effort is normal.      Breath sounds: Normal breath sounds. Abdominal:      General: Abdomen is flat. Palpations: Abdomen is soft. Musculoskeletal:         General: Normal range of motion. Cervical back: Normal range of motion and neck supple. Skin:     General: Skin is warm and dry. Neurological:      Mental Status: She is alert and oriented to person, place, and time. Deep Tendon Reflexes: Reflexes are normal and symmetric.        Lab Review   No visits with results within 6 Month(s) from this visit. Latest known visit with results is:   Orders Only on 03/09/2022   Component Date Value    Sodium 03/09/2022 139     Potassium 03/09/2022 4.3     Chloride 03/09/2022 102     CO2 03/09/2022 22     Anion Gap 03/09/2022 15     Glucose 03/09/2022 105 (A)     BUN 03/09/2022 7     Creatinine 03/09/2022 0.6     GFR Non- 03/09/2022 >60     GFR  03/09/2022 >60     Calcium 03/09/2022 9.3     Total Protein 03/09/2022 7.4     Albumin 03/09/2022 4.2     Albumin/Globulin Ratio 03/09/2022 1.3     Total Bilirubin 03/09/2022 <0.2     Alkaline Phosphatase 03/09/2022 90     ALT 03/09/2022 12     AST 03/09/2022 12 (A)     Cholesterol, Fasting 03/09/2022 228 (A)     Triglyceride, Fasting 03/09/2022 142     HDL 03/09/2022 38 (A)     LDL Calculated 03/09/2022 162 (A)     VLDL Cholesterol Calcula* 03/09/2022 28     TSH Reflex FT4 03/09/2022 9.72      notapplicable      Health Maintenance   Topic Date Due    COVID-19 Vaccine (1) Never done    Hepatitis C screen  Never done    Cervical cancer screen  Never done    Flu vaccine (1) 08/01/2022    A1C test (Diabetic or Prediabetic)  03/09/2023    Depression Screen  03/09/2023    Lipids  10/26/2023    DTaP/Tdap/Td vaccine (2 - Td or Tdap) 11/14/2023    Hepatitis A vaccine  Aged Out    Hib vaccine  Aged Out    Meningococcal (ACWY) vaccine  Aged Out    Pneumococcal 0-64 years Vaccine  Aged Out    Varicella vaccine  Discontinued    HIV screen  Discontinued          Assessment/Plan:    Hyperlipidemia  Lab review and medication review    1. Hyperlipidemia, unspecified hyperlipidemia type    - Lipid, Fasting; Future  - Comprehensive Metabolic Panel; Future  - TSH with Reflex to FT4; Future  - Urinalysis; Future  - CBC with Auto Differential; Future  - atorvastatin (LIPITOR) 40 MG tablet; Take 1 tablet by mouth daily  Dispense: 90 tablet; Refill: 1    2. Vitamin D deficiency    - Vitamin D 25 Hydroxy;  Future  - vitamin D (ERGOCALCIFEROL) 1.25 MG (34099 UT) CAPS capsule; Take 1 capsule by mouth once a week  Dispense: 12 capsule; Refill: 4    3. Elevated hemoglobin A1c    [facility]    4. Hordeolum externum of right upper eyelid    - sulfacetamide (BLEPH-10) 10 % ophthalmic solution; Place 2 drops into the right eye 4 times daily for 10 days  Dispense: 10 mL; Refill: 0    5. Pre-diabetes    - Tirzepatide (MOUNJARO) 2.5 MG/0.5ML SOPN SC injection; Inject 0.5 mLs into the skin once a week for 14 days  Dispense: 1 mL; Refill: 0   40 minutes were spent on this visit    Return in about 2 weeks (around 11/9/2022).

## 2022-10-27 NOTE — PROGRESS NOTES
[facility]  Call back for results or view in my chart

## 2022-10-31 ENCOUNTER — TELEPHONE (OUTPATIENT)
Dept: ADMINISTRATIVE | Age: 42
End: 2022-10-31

## 2022-10-31 NOTE — TELEPHONE ENCOUNTER
Submitted PA for [facility] 2.5MG/0.5ML pen-injectors. Key: [id number] Via [employer] STATUS: APPROVED 10/03/2022; Coverage End Date:11/02/2023. Will scan letter when received. If this requires a response please respond to the pool ( P [id number] [facility]). Thank you please advise patient.

## 2022-11-09 ENCOUNTER — OFFICE VISIT (OUTPATIENT)
Dept: PRIMARY CARE CLINIC | Age: 42
End: 2022-11-09
Payer: COMMERCIAL

## 2022-11-09 VITALS
BODY MASS INDEX: 44.72 KG/M2 | HEART RATE: 72 BPM | HEIGHT: 62 IN | DIASTOLIC BLOOD PRESSURE: 82 MMHG | WEIGHT: 243 LBS | SYSTOLIC BLOOD PRESSURE: 124 MMHG | TEMPERATURE: 98.2 F

## 2022-11-09 DIAGNOSIS — R73.09 ELEVATED HEMOGLOBIN A1C: Primary | ICD-10-CM

## 2022-11-09 PROCEDURE — 99213 OFFICE O/P EST LOW 20 MIN: CPT | Performed by: INTERNAL MEDICINE

## 2022-11-09 RX ORDER — TIRZEPATIDE 5 MG/.5ML
5 INJECTION, SOLUTION SUBCUTANEOUS WEEKLY
Qty: 4 ADJUSTABLE DOSE PRE-FILLED PEN SYRINGE | Refills: 1 | Status: SHIPPED | OUTPATIENT
Start: 2022-11-09 | End: 2022-12-09

## 2022-11-09 ASSESSMENT — ENCOUNTER SYMPTOMS
EYES NEGATIVE: 1
EYE DISCHARGE: 0
RESPIRATORY NEGATIVE: 1

## 2022-11-09 NOTE — PROGRESS NOTES
Doing well on medication and has had some weight loss . Increase dose and check weight in a month. Sandeep Dominique  YOB: 1980    Date of Service:  11/9/2022    Chief Complaint   Patient presents with    Follow-up         Diabetes  She presents for her follow-up diabetic visit. She has type 2 diabetes mellitus. Her disease course has been stable. Pertinent negatives for hypoglycemia include no confusion or nervousness/anxiousness. There are no diabetic associated symptoms. Allergies   Allergen Reactions    Latex Rash     Outpatient Medications Marked as Taking for the 11/9/22 encounter (Office Visit) with Wali Shah MD   Medication Sig Dispense Refill    Tirzepatide [facility]) 5 MG/0.5ML SOPN SC injection Inject 0.5 mLs into the skin once a week 4 Adjustable Dose Pre-filled Pen Syringe 1    vitamin D (ERGOCALCIFEROL) 1.25 MG (08171 UT) CAPS capsule Take 1 capsule by mouth once a week 12 capsule 4    atorvastatin (LIPITOR) 40 MG tablet Take 1 tablet by mouth daily 90 tablet 1    Multiple Vitamins-Minerals (THERAPEUTIC MULTIVITAMIN-MINERALS) tablet Take 1 tablet by mouth daily. Fish Oil OIL by Does not apply route         Immunization History   Administered Date(s) Administered    Influenza, FLUARIX, FLULAVAL, FLUZONE (age 10 mo+) AND AFLURIA, (age 1 y+), PF, 0.5mL 11/14/2018    Rubella 05/19/2008    Tdap (Boostrix, Adacel) 11/14/2013       No past medical history on file. Past Surgical History:   Procedure Laterality Date    TUBAL LIGATION  2007     Family History   Problem Relation Age of Onset    High Blood Pressure Mother     Diabetes Paternal Aunt     Diabetes Paternal Uncle     Heart Disease Maternal Grandmother     Heart Disease Maternal Grandfather     Diabetes Paternal Grandfather        Review of Systems:  Review of Systems   Constitutional:  Negative for activity change and appetite change. HENT: Negative. Eyes: Negative. Negative for discharge.    Respiratory: Negative. Genitourinary:  Negative for difficulty urinating. Musculoskeletal:  Negative for arthralgias. Skin:  Negative for rash. Neurological: Negative. Psychiatric/Behavioral: Negative. Negative for agitation and confusion. The patient is not nervous/anxious. All other systems reviewed and are negative. Vitals:    11/09/22 0928   BP: 124/82   Pulse: 72   Temp: 98.2 °F (36.8 °C)   TempSrc: Temporal   Weight: 243 lb (110.2 kg)   Height: 5' 2\" (1.575 m)     Body mass index is 44.45 kg/m². Wt Readings from Last 3 Encounters:   11/09/22 243 lb (110.2 kg)   10/26/22 244 lb 14.4 oz (111.1 kg)   03/09/22 235 lb (106.6 kg)     BP Readings from Last 3 Encounters:   11/09/22 124/82   10/26/22 130/84   03/09/22 115/83         Physical Exam  Constitutional:       Appearance: Normal appearance. She is well-developed. HENT:      Head: Normocephalic and atraumatic. Eyes:      Conjunctiva/sclera: Conjunctivae normal.      Pupils: Pupils are equal, round, and reactive to light. Cardiovascular:      Rate and Rhythm: Normal rate and regular rhythm. Pulses: Normal pulses. Heart sounds: Normal heart sounds. Pulmonary:      Effort: Pulmonary effort is normal.      Breath sounds: Normal breath sounds. Abdominal:      General: Abdomen is flat. Palpations: Abdomen is soft. Musculoskeletal:         General: Normal range of motion. Cervical back: Normal range of motion and neck supple. Skin:     General: Skin is warm and dry. Neurological:      Mental Status: She is alert and oriented to person, place, and time. Deep Tendon Reflexes: Reflexes are normal and symmetric.        Lab Review   Orders Only on 10/26/2022   Component Date Value    WBC 10/26/2022 10.9     RBC 10/26/2022 4.24     Hemoglobin 10/26/2022 10.7 (A)     Hematocrit 10/26/2022 33.6 (A)     MCV 10/26/2022 79.3 (A)     MCH 10/26/2022 25.2 (A)     MCHC 10/26/2022 31.7     RDW 10/26/2022 16.9 (A)     Platelets 10/26/2022 469 (A)     MPV 10/26/2022 7.2     Neutrophils % 10/26/2022 62.1     Lymphocytes % 10/26/2022 28.4     Monocytes % 10/26/2022 7.0     Eosinophils % 10/26/2022 1.5     Basophils % 10/26/2022 1.0     Neutrophils Absolute 10/26/2022 6.8     Lymphocytes Absolute 10/26/2022 3.1     Monocytes Absolute 10/26/2022 0.8     Eosinophils Absolute 10/26/2022 0.2     Basophils Absolute 10/26/2022 0.1     Color, UA 10/26/2022 Yellow     Clarity, UA 10/26/2022 Clear     Glucose, Ur 10/26/2022 Negative     Bilirubin Urine 10/26/2022 Negative     Ketones, Urine 10/26/2022 Negative     Specific Gravity, UA 10/26/2022 1.019     Blood, Urine 10/26/2022 Negative     pH, UA 10/26/2022 5.0     Protein, UA 10/26/2022 Negative     Urobilinogen, Urine 10/26/2022 0.2     Nitrite, Urine 10/26/2022 Negative     Leukocyte Esterase, Urine 10/26/2022 Negative     Microscopic Examination 10/26/2022 Not Indicated     Urine Type 10/26/2022 Cleancatch     TSH Reflex FT4 10/26/2022 1.76     Sodium 10/26/2022 137     Potassium 10/26/2022 4.1     Chloride 10/26/2022 101     CO2 10/26/2022 22     Anion Gap 10/26/2022 14     Glucose 10/26/2022 96     BUN 10/26/2022 7     Creatinine 10/26/2022 0.6     Est, Glom Filt Rate 10/26/2022 >60     Calcium 10/26/2022 9.2     Total Protein 10/26/2022 7.0     Albumin 10/26/2022 4.1     Albumin/Globulin Ratio 10/26/2022 1.4     Total Bilirubin 10/26/2022 <0.2     Alkaline Phosphatase 10/26/2022 88     ALT 10/26/2022 15     AST 10/26/2022 13 (A)     Vit D, 25-Hydroxy 10/26/2022 20.9 (A)     Cholesterol, Fasting 10/26/2022 200 (A)     Triglyceride, Fasting 10/26/2022 172 (A)     HDL 10/26/2022 39 (A)     LDL Calculated 10/26/2022 127 (A)     VLDL Cholesterol Calcula* 65/76/7092 34      notapplicable      Health Maintenance   Topic Date Due    COVID-19 Vaccine (1) Never done    Hepatitis C screen  Never done    Cervical cancer screen  Never done    Flu vaccine (1) 08/01/2022    A1C test (Diabetic or Prediabetic) 03/09/2023    Depression Screen  03/09/2023    Lipids  10/26/2023    DTaP/Tdap/Td vaccine (2 - Td or Tdap) 11/14/2023    Hepatitis A vaccine  Aged Out    Hib vaccine  Aged Out    Meningococcal (ACWY) vaccine  Aged Out    Pneumococcal 0-64 years Vaccine  Aged Out    Varicella vaccine  Discontinued    HIV screen  Discontinued          Assessment/Plan:    No problem-specific Assessment & Plan notes found for this encounter. 1. Elevated hemoglobin A1c    - Tirzepatide (MOUNJARO) 5 MG/0.5ML SOPN SC injection; Inject 0.5 mLs into the skin once a week  Dispense: 4 Adjustable Dose Pre-filled Pen Syringe; Refill: 1       Return in about 4 weeks (around 12/7/2022).

## 2022-11-09 NOTE — PATIENT INSTRUCTIONS
Please titrate the Mounjaro dose up to 5mg/0.5ml SC weekly injection. Discontinue the 2.5 mg dose. See me in one month for a weight check.

## 2022-12-14 ENCOUNTER — OFFICE VISIT (OUTPATIENT)
Dept: PRIMARY CARE CLINIC | Age: 42
End: 2022-12-14
Payer: COMMERCIAL

## 2022-12-14 VITALS
BODY MASS INDEX: 43.43 KG/M2 | TEMPERATURE: 98.4 F | DIASTOLIC BLOOD PRESSURE: 77 MMHG | HEART RATE: 74 BPM | WEIGHT: 236 LBS | SYSTOLIC BLOOD PRESSURE: 122 MMHG | HEIGHT: 62 IN

## 2022-12-14 DIAGNOSIS — F41.9 ANXIETY: Primary | ICD-10-CM

## 2022-12-14 DIAGNOSIS — R73.09 ELEVATED HEMOGLOBIN A1C: ICD-10-CM

## 2022-12-14 PROCEDURE — 99214 OFFICE O/P EST MOD 30 MIN: CPT | Performed by: INTERNAL MEDICINE

## 2022-12-14 RX ORDER — PAROXETINE 10 MG/1
10 TABLET, FILM COATED ORAL EVERY EVENING
Qty: 30 TABLET | Refills: 3 | Status: SHIPPED | OUTPATIENT
Start: 2022-12-14

## 2022-12-14 RX ORDER — TIRZEPATIDE 5 MG/.5ML
5 INJECTION, SOLUTION SUBCUTANEOUS WEEKLY
Qty: 4 ADJUSTABLE DOSE PRE-FILLED PEN SYRINGE | Refills: 1 | Status: SHIPPED | OUTPATIENT
Start: 2022-12-14 | End: 2023-01-13

## 2022-12-14 NOTE — PATIENT INSTRUCTIONS
Please start on Paxil once in the evening for anxiety. I reordered Mounjaro 5 mg but you should have a refill left on the order, last month. Please follow-up with me in 1 month.

## 2022-12-15 NOTE — PROGRESS NOTES
Doing well on medication and has had some weight loss . Increase dose and check weight in a month. Robert Chow  YOB: 1980    Date of Service:  11/9/2022    Chief Complaint   Patient presents with    Follow-up         Diabetes  She presents for her follow-up diabetic visit. She has type 2 diabetes mellitus. Her disease course has been stable. Pertinent negatives for hypoglycemia include no confusion or nervousness/anxiousness. There are no diabetic associated symptoms. Allergies   Allergen Reactions    Latex Rash     Outpatient Medications Marked as Taking for the 11/9/22 encounter (Office Visit) with Azalee Hodgkins, MD   Medication Sig Dispense Refill    Tirzepatide [facility]) 5 MG/0.5ML SOPN SC injection Inject 0.5 mLs into the skin once a week 4 Adjustable Dose Pre-filled Pen Syringe 1    vitamin D (ERGOCALCIFEROL) 1.25 MG (35230 UT) CAPS capsule Take 1 capsule by mouth once a week 12 capsule 4    atorvastatin (LIPITOR) 40 MG tablet Take 1 tablet by mouth daily 90 tablet 1    Multiple Vitamins-Minerals (THERAPEUTIC MULTIVITAMIN-MINERALS) tablet Take 1 tablet by mouth daily. Fish Oil OIL by Does not apply route         Immunization History   Administered Date(s) Administered    Influenza, FLUARIX, FLULAVAL, FLUZONE (age 10 mo+) AND AFLURIA, (age 1 y+), PF, 0.5mL 11/14/2018    Rubella 05/19/2008    Tdap (Boostrix, Adacel) 11/14/2013       No past medical history on file. Past Surgical History:   Procedure Laterality Date    TUBAL LIGATION  2007     Family History   Problem Relation Age of Onset    High Blood Pressure Mother     Diabetes Paternal Aunt     Diabetes Paternal Uncle     Heart Disease Maternal Grandmother     Heart Disease Maternal Grandfather     Diabetes Paternal Grandfather        Review of Systems:  Review of Systems   Constitutional:  Negative for activity change and appetite change. HENT: Negative. Eyes: Negative. Negative for discharge.    Respiratory: Negative. Genitourinary:  Negative for difficulty urinating. Musculoskeletal:  Negative for arthralgias. Skin:  Negative for rash. Neurological: Negative. Psychiatric/Behavioral: Negative. Negative for agitation and confusion. The patient is not nervous/anxious. All other systems reviewed and are negative. Vitals:    11/09/22 0928   BP: 124/82   Pulse: 72   Temp: 98.2 °F (36.8 °C)   TempSrc: Temporal   Weight: 243 lb (110.2 kg)   Height: 5' 2\" (1.575 m)     Body mass index is 44.45 kg/m². Wt Readings from Last 3 Encounters:   11/09/22 243 lb (110.2 kg)   10/26/22 244 lb 14.4 oz (111.1 kg)   03/09/22 235 lb (106.6 kg)     BP Readings from Last 3 Encounters:   11/09/22 124/82   10/26/22 130/84   03/09/22 115/83         Physical Exam  Constitutional:       Appearance: Normal appearance. She is well-developed. HENT:      Head: Normocephalic and atraumatic. Eyes:      Conjunctiva/sclera: Conjunctivae normal.      Pupils: Pupils are equal, round, and reactive to light. Cardiovascular:      Rate and Rhythm: Normal rate and regular rhythm. Pulses: Normal pulses. Heart sounds: Normal heart sounds. Pulmonary:      Effort: Pulmonary effort is normal.      Breath sounds: Normal breath sounds. Abdominal:      General: Abdomen is flat. Palpations: Abdomen is soft. Musculoskeletal:         General: Normal range of motion. Cervical back: Normal range of motion and neck supple. Skin:     General: Skin is warm and dry. Neurological:      Mental Status: She is alert and oriented to person, place, and time. Deep Tendon Reflexes: Reflexes are normal and symmetric.        Lab Review   Orders Only on 10/26/2022   Component Date Value    WBC 10/26/2022 10.9     RBC 10/26/2022 4.24     Hemoglobin 10/26/2022 10.7 (A)     Hematocrit 10/26/2022 33.6 (A)     MCV 10/26/2022 79.3 (A)     MCH 10/26/2022 25.2 (A)     MCHC 10/26/2022 31.7     RDW 10/26/2022 16.9 (A)     Platelets 10/26/2022 469 (A)     MPV 10/26/2022 7.2     Neutrophils % 10/26/2022 62.1     Lymphocytes % 10/26/2022 28.4     Monocytes % 10/26/2022 7.0     Eosinophils % 10/26/2022 1.5     Basophils % 10/26/2022 1.0     Neutrophils Absolute 10/26/2022 6.8     Lymphocytes Absolute 10/26/2022 3.1     Monocytes Absolute 10/26/2022 0.8     Eosinophils Absolute 10/26/2022 0.2     Basophils Absolute 10/26/2022 0.1     Color, UA 10/26/2022 Yellow     Clarity, UA 10/26/2022 Clear     Glucose, Ur 10/26/2022 Negative     Bilirubin Urine 10/26/2022 Negative     Ketones, Urine 10/26/2022 Negative     Specific Gravity, UA 10/26/2022 1.019     Blood, Urine 10/26/2022 Negative     pH, UA 10/26/2022 5.0     Protein, UA 10/26/2022 Negative     Urobilinogen, Urine 10/26/2022 0.2     Nitrite, Urine 10/26/2022 Negative     Leukocyte Esterase, Urine 10/26/2022 Negative     Microscopic Examination 10/26/2022 Not Indicated     Urine Type 10/26/2022 Cleancatch     TSH Reflex FT4 10/26/2022 1.76     Sodium 10/26/2022 137     Potassium 10/26/2022 4.1     Chloride 10/26/2022 101     CO2 10/26/2022 22     Anion Gap 10/26/2022 14     Glucose 10/26/2022 96     BUN 10/26/2022 7     Creatinine 10/26/2022 0.6     Est, Glom Filt Rate 10/26/2022 >60     Calcium 10/26/2022 9.2     Total Protein 10/26/2022 7.0     Albumin 10/26/2022 4.1     Albumin/Globulin Ratio 10/26/2022 1.4     Total Bilirubin 10/26/2022 <0.2     Alkaline Phosphatase 10/26/2022 88     ALT 10/26/2022 15     AST 10/26/2022 13 (A)     Vit D, 25-Hydroxy 10/26/2022 20.9 (A)     Cholesterol, Fasting 10/26/2022 200 (A)     Triglyceride, Fasting 10/26/2022 172 (A)     HDL 10/26/2022 39 (A)     LDL Calculated 10/26/2022 127 (A)     VLDL Cholesterol Calcula* 05/60/1901 34      notapplicable      Health Maintenance   Topic Date Due    COVID-19 Vaccine (1) Never done    Hepatitis C screen  Never done    Cervical cancer screen  Never done    Flu vaccine (1) 08/01/2022    A1C test (Diabetic or Prediabetic) 03/09/2023    Depression Screen  03/09/2023    Lipids  10/26/2023    DTaP/Tdap/Td vaccine (2 - Td or Tdap) 11/14/2023    Hepatitis A vaccine  Aged Out    Hib vaccine  Aged Out    Meningococcal (ACWY) vaccine  Aged Out    Pneumococcal 0-64 years Vaccine  Aged Out    Varicella vaccine  Discontinued    HIV screen  Discontinued          Assessment/Plan:    No problem-specific Assessment & Plan notes found for this encounter. 1. Elevated hemoglobin A1c    - Tirzepatide (MOUNJARO) 5 MG/0.5ML SOPN SC injection; Inject 0.5 mLs into the skin once a week  Dispense: 4 Adjustable Dose Pre-filled Pen Syringe; Refill: 1           8 more pounds lost on the 5 mg dose in a month. Patient prefers to stay on that dose. Follow up weight check in a month. Also has anxiety and difficulty getting to sleep. Started on Paxil.   Return in about 4 weeks

## 2023-01-11 ENCOUNTER — OFFICE VISIT (OUTPATIENT)
Dept: PRIMARY CARE CLINIC | Age: 43
End: 2023-01-11
Payer: COMMERCIAL

## 2023-01-11 VITALS
WEIGHT: 230 LBS | SYSTOLIC BLOOD PRESSURE: 115 MMHG | HEIGHT: 62 IN | DIASTOLIC BLOOD PRESSURE: 74 MMHG | HEART RATE: 78 BPM | TEMPERATURE: 98.1 F | BODY MASS INDEX: 42.33 KG/M2

## 2023-01-11 DIAGNOSIS — R53.83 FATIGUE, UNSPECIFIED TYPE: ICD-10-CM

## 2023-01-11 DIAGNOSIS — E55.9 VITAMIN D DEFICIENCY: Primary | ICD-10-CM

## 2023-01-11 DIAGNOSIS — D50.9 IRON DEFICIENCY ANEMIA, UNSPECIFIED IRON DEFICIENCY ANEMIA TYPE: Primary | ICD-10-CM

## 2023-01-11 DIAGNOSIS — R73.09 ELEVATED HEMOGLOBIN A1C: ICD-10-CM

## 2023-01-11 DIAGNOSIS — E55.9 VITAMIN D DEFICIENCY: ICD-10-CM

## 2023-01-11 LAB
BASOPHILS ABSOLUTE: 0.1 K/UL (ref 0–0.2)
BASOPHILS RELATIVE PERCENT: 0.9 %
EOSINOPHILS ABSOLUTE: 0.2 K/UL (ref 0–0.6)
EOSINOPHILS RELATIVE PERCENT: 1.5 %
HCT VFR BLD CALC: 32.5 % (ref 36–48)
HEMOGLOBIN: 10.3 G/DL (ref 12–16)
LYMPHOCYTES ABSOLUTE: 2.6 K/UL (ref 1–5.1)
LYMPHOCYTES RELATIVE PERCENT: 25.3 %
MCH RBC QN AUTO: 24.7 PG (ref 26–34)
MCHC RBC AUTO-ENTMCNC: 31.6 G/DL (ref 31–36)
MCV RBC AUTO: 78.1 FL (ref 80–100)
MONOCYTES ABSOLUTE: 0.8 K/UL (ref 0–1.3)
MONOCYTES RELATIVE PERCENT: 7.4 %
NEUTROPHILS ABSOLUTE: 6.6 K/UL (ref 1.7–7.7)
NEUTROPHILS RELATIVE PERCENT: 64.9 %
PDW BLD-RTO: 18.9 % (ref 12.4–15.4)
PLATELET # BLD: 466 K/UL (ref 135–450)
PMV BLD AUTO: 7.9 FL (ref 5–10.5)
RBC # BLD: 4.16 M/UL (ref 4–5.2)
VITAMIN D 25-HYDROXY: 23.9 NG/ML
WBC # BLD: 10.2 K/UL (ref 4–11)

## 2023-01-11 PROCEDURE — 99213 OFFICE O/P EST LOW 20 MIN: CPT | Performed by: INTERNAL MEDICINE

## 2023-01-11 RX ORDER — FERROUS SULFATE 325(65) MG
325 TABLET ORAL 2 TIMES DAILY
Qty: 180 TABLET | Refills: 1 | Status: SHIPPED | OUTPATIENT
Start: 2023-01-11

## 2023-01-11 RX ORDER — ERGOCALCIFEROL 1.25 MG/1
50000 CAPSULE ORAL WEEKLY
Qty: 12 CAPSULE | Refills: 4 | Status: SHIPPED | OUTPATIENT
Start: 2023-01-11 | End: 2024-01-12

## 2023-01-11 RX ORDER — MULTIVIT WITH MINERALS/LUTEIN
250 TABLET ORAL 2 TIMES DAILY
Qty: 180 TABLET | Refills: 2 | Status: SHIPPED | OUTPATIENT
Start: 2023-01-11 | End: 2023-04-11

## 2023-01-11 RX ORDER — TIRZEPATIDE 5 MG/.5ML
5 INJECTION, SOLUTION SUBCUTANEOUS WEEKLY
Qty: 4 ADJUSTABLE DOSE PRE-FILLED PEN SYRINGE | Refills: 1 | Status: SHIPPED | OUTPATIENT
Start: 2023-01-11 | End: 2023-02-10

## 2023-01-11 ASSESSMENT — PATIENT HEALTH QUESTIONNAIRE - PHQ9
SUM OF ALL RESPONSES TO PHQ9 QUESTIONS 1 & 2: 0
2. FEELING DOWN, DEPRESSED OR HOPELESS: 0
SUM OF ALL RESPONSES TO PHQ QUESTIONS 1-9: 0
1. LITTLE INTEREST OR PLEASURE IN DOING THINGS: 0
SUM OF ALL RESPONSES TO PHQ QUESTIONS 1-9: 0

## 2023-01-11 ASSESSMENT — ENCOUNTER SYMPTOMS
EYES NEGATIVE: 1
GASTROINTESTINAL NEGATIVE: 1
RESPIRATORY NEGATIVE: 1

## 2023-01-11 NOTE — PROGRESS NOTES
Alphonsa Boas (:  1980) is a 40 y.o. female,Established patient, here for evaluation of the following chief complaint(s):  1 Month Follow-Up    Weight loss continues on Mounjaro. Follow up in a month to check weight. ASSESSMENT/PLAN:  1. Vitamin D deficiency  -     Vitamin D 25 Hydroxy; Future  2. Elevated hemoglobin A1c  -     Tirzepatide (MOUNJARO) 5 MG/0.5ML SOPN SC injection; Inject 0.5 mLs into the skin once a week, Disp-4 Adjustable Dose Pre-filled Pen Syringe, R-1Normal  3. Fatigue, unspecified type  -     CBC with Auto Differential; Future      Return in about 4 weeks (around 2023). Subjective   SUBJECTIVE/OBJECTIVE:  Diabetes  She presents for her follow-up diabetic visit. She has type 2 diabetes mellitus. Her disease course has been stable. There are no hypoglycemic associated symptoms. There are no diabetic associated symptoms. There are no hypoglycemic complications. Symptoms are stable. There are no diabetic complications. Other  Chronicity: weight loss follow up. The current episode started more than 1 year ago. The problem occurs constantly. The problem has been gradually improving. Review of Systems   Constitutional: Negative. HENT: Negative. Eyes: Negative. Respiratory: Negative. Cardiovascular: Negative. Gastrointestinal: Negative. Genitourinary: Negative. Musculoskeletal: Negative. Skin: Negative. Neurological: Negative. Psychiatric/Behavioral: Negative. Objective   Physical Exam  Constitutional:       Appearance: Normal appearance. She is well-developed. HENT:      Head: Normocephalic and atraumatic. Eyes:      Conjunctiva/sclera: Conjunctivae normal.      Pupils: Pupils are equal, round, and reactive to light. Cardiovascular:      Rate and Rhythm: Normal rate and regular rhythm. Pulses: Normal pulses. Heart sounds: Normal heart sounds.    Pulmonary:      Effort: Pulmonary effort is normal.      Breath sounds: Normal breath sounds. Abdominal:      General: Abdomen is flat. Palpations: Abdomen is soft. Musculoskeletal:         General: Normal range of motion. Cervical back: Normal range of motion and neck supple. Skin:     General: Skin is warm and dry. Neurological:      Mental Status: She is alert and oriented to person, place, and time. Deep Tendon Reflexes: Reflexes are normal and symmetric. No problem-specific Assessment & Plan notes found for this encounter. On this date 1/11/2023 I have spent 30 minutes reviewing previous notes, test results and face to face with the patient discussing the diagnosis and importance of compliance with the treatment plan as well as documenting on the day of the visit. An electronic signature was used to authenticate this note.     --Jumana Capone MD

## 2023-01-11 NOTE — PATIENT INSTRUCTIONS
Please continue your current medications for now. Lab review as discussed. Follow-up in 1 month for a weight check. Continue the current dose of Mounjaro.

## 2023-02-22 ENCOUNTER — OFFICE VISIT (OUTPATIENT)
Dept: PRIMARY CARE CLINIC | Age: 43
End: 2023-02-22
Payer: COMMERCIAL

## 2023-02-22 VITALS
WEIGHT: 227 LBS | HEART RATE: 69 BPM | DIASTOLIC BLOOD PRESSURE: 84 MMHG | BODY MASS INDEX: 41.77 KG/M2 | TEMPERATURE: 98.2 F | HEIGHT: 62 IN | SYSTOLIC BLOOD PRESSURE: 121 MMHG

## 2023-02-22 DIAGNOSIS — R73.03 PRE-DIABETES: Primary | ICD-10-CM

## 2023-02-22 PROCEDURE — 99213 OFFICE O/P EST LOW 20 MIN: CPT | Performed by: INTERNAL MEDICINE

## 2023-02-22 RX ORDER — TIRZEPATIDE 7.5 MG/.5ML
7.5 INJECTION, SOLUTION SUBCUTANEOUS WEEKLY
Qty: 4 ADJUSTABLE DOSE PRE-FILLED PEN SYRINGE | Refills: 2 | Status: SHIPPED | OUTPATIENT
Start: 2023-02-22

## 2023-02-22 SDOH — ECONOMIC STABILITY: FOOD INSECURITY: WITHIN THE PAST 12 MONTHS, THE FOOD YOU BOUGHT JUST DIDN'T LAST AND YOU DIDN'T HAVE MONEY TO GET MORE.: NEVER TRUE

## 2023-02-22 SDOH — ECONOMIC STABILITY: INCOME INSECURITY: HOW HARD IS IT FOR YOU TO PAY FOR THE VERY BASICS LIKE FOOD, HOUSING, MEDICAL CARE, AND HEATING?: NOT HARD AT ALL

## 2023-02-22 SDOH — ECONOMIC STABILITY: HOUSING INSECURITY
IN THE LAST 12 MONTHS, WAS THERE A TIME WHEN YOU DID NOT HAVE A STEADY PLACE TO SLEEP OR SLEPT IN A SHELTER (INCLUDING NOW)?: NO

## 2023-02-22 SDOH — ECONOMIC STABILITY: FOOD INSECURITY: WITHIN THE PAST 12 MONTHS, YOU WORRIED THAT YOUR FOOD WOULD RUN OUT BEFORE YOU GOT MONEY TO BUY MORE.: NEVER TRUE

## 2023-02-23 NOTE — PROGRESS NOTES
Rahat Nunez (:  1980) is a 40 y.o. female,Established patient, here for evaluation of the following chief complaint(s):  1 Month Follow-Up    Weight loss continues on Mounjaro. Follow up in a month to check weight. ASSESSMENT/PLAN:  1. Vitamin D deficiency  -     Vitamin D 25 Hydroxy; Future  2. Elevated hemoglobin A1c  The Mounjaro was increased to 7.5 at the patient's request  3. Fatigue, unspecified type  -     CBC with Auto Differential; Future      Return in about 4 weeks          Subjective   SUBJECTIVE/OBJECTIVE:  Diabetes  She presents for her follow-up diabetic visit. She has type 2 diabetes mellitus. Her disease course has been stable. There are no hypoglycemic associated symptoms. There are no diabetic associated symptoms. There are no hypoglycemic complications. Symptoms are stable. There are no diabetic complications. Other  Chronicity: weight loss follow up. The current episode started more than 1 year ago. The problem occurs constantly. The problem has been gradually improving. Review of Systems   Constitutional: Negative. HENT: Negative. Eyes: Negative. Respiratory: Negative. Cardiovascular: Negative. Gastrointestinal: Negative. Genitourinary: Negative. Musculoskeletal: Negative. Skin: Negative. Neurological: Negative. Psychiatric/Behavioral: Negative. Objective   Physical Exam  Constitutional:       Appearance: Normal appearance. She is well-developed. HENT:      Head: Normocephalic and atraumatic. Eyes:      Conjunctiva/sclera: Conjunctivae normal.      Pupils: Pupils are equal, round, and reactive to light. Cardiovascular:      Rate and Rhythm: Normal rate and regular rhythm. Pulses: Normal pulses. Heart sounds: Normal heart sounds. Pulmonary:      Effort: Pulmonary effort is normal.      Breath sounds: Normal breath sounds. Abdominal:      General: Abdomen is flat. Palpations: Abdomen is soft. Musculoskeletal:         General: Normal range of motion. Cervical back: Normal range of motion and neck supple. Skin:     General: Skin is warm and dry. Neurological:      Mental Status: She is alert and oriented to person, place, and time. Deep Tendon Reflexes: Reflexes are normal and symmetric. No problem-specific Assessment & Plan notes found for this encounter. On this date 2/22/2023 I have spent 30 minutes reviewing previous notes, test results and face to face with the patient discussing the diagnosis and importance of compliance with the treatment plan as well as documenting on the day of the visit. An electronic signature was used to authenticate this note.     --Jumana Capone MD

## 2023-03-22 ENCOUNTER — OFFICE VISIT (OUTPATIENT)
Dept: PRIMARY CARE CLINIC | Age: 43
End: 2023-03-22
Payer: COMMERCIAL

## 2023-03-22 VITALS
DIASTOLIC BLOOD PRESSURE: 76 MMHG | HEIGHT: 62 IN | SYSTOLIC BLOOD PRESSURE: 127 MMHG | BODY MASS INDEX: 41.22 KG/M2 | WEIGHT: 224 LBS | HEART RATE: 83 BPM | TEMPERATURE: 97 F

## 2023-03-22 DIAGNOSIS — R73.03 PRE-DIABETES: Primary | ICD-10-CM

## 2023-03-22 PROCEDURE — 99213 OFFICE O/P EST LOW 20 MIN: CPT | Performed by: INTERNAL MEDICINE

## 2023-03-22 NOTE — PATIENT INSTRUCTIONS
You are down to 3 pounds in the last 30 days. You have lost a total of 20 pounds in the last 5 months. Finish the 5 mg Mounjaro prescription and then start on the 7.5 milligram prescription. Continue your current diet and exercise program.  See me again in 1 month.

## 2023-03-23 NOTE — PROGRESS NOTES
Alvaro Lala (:  1980) is a 40 y.o. female,Established patient, here for evaluation of the following chief complaint(s):  1 Month Follow-Up    Weight loss continues on Mounjaro. Follow up in 3 months to check weight. ASSESSMENT/PLAN:  1. Vitamin D deficiency  -     Vitamin D 25 Hydroxy; Future  2. Elevated hemoglobin A1c  The Mounjaro was increased to 7.5 at the patient's request  3. Fatigue, unspecified type  -     CBC with Auto Differential; Future      Return in about 4 weeks          Subjective   SUBJECTIVE/OBJECTIVE:  Diabetes  She presents for her follow-up diabetic visit. She has type 2 diabetes mellitus. Her disease course has been stable. There are no hypoglycemic associated symptoms. There are no diabetic associated symptoms. There are no hypoglycemic complications. Symptoms are stable. There are no diabetic complications. Other  Chronicity: weight loss follow up. The current episode started more than 1 year ago. The problem occurs constantly. The problem has been gradually improving. Review of Systems   Constitutional: Negative. HENT: Negative. Eyes: Negative. Respiratory: Negative. Cardiovascular: Negative. Gastrointestinal: Negative. Genitourinary: Negative. Musculoskeletal: Negative. Skin: Negative. Neurological: Negative. Psychiatric/Behavioral: Negative. Objective   Physical Exam  Constitutional:       Appearance: Normal appearance. She is well-developed. HENT:      Head: Normocephalic and atraumatic. Eyes:      Conjunctiva/sclera: Conjunctivae normal.      Pupils: Pupils are equal, round, and reactive to light. Cardiovascular:      Rate and Rhythm: Normal rate and regular rhythm. Pulses: Normal pulses. Heart sounds: Normal heart sounds. Pulmonary:      Effort: Pulmonary effort is normal.      Breath sounds: Normal breath sounds. Abdominal:      General: Abdomen is flat. Palpations: Abdomen is soft.

## 2023-05-08 ENCOUNTER — TELEPHONE (OUTPATIENT)
Dept: PRIMARY CARE CLINIC | Age: 43
End: 2023-05-08

## 2023-05-08 DIAGNOSIS — E55.9 VITAMIN D DEFICIENCY: ICD-10-CM

## 2023-05-08 DIAGNOSIS — R73.03 PRE-DIABETES: ICD-10-CM

## 2023-05-08 RX ORDER — TIRZEPATIDE 7.5 MG/.5ML
7.5 INJECTION, SOLUTION SUBCUTANEOUS WEEKLY
Qty: 4 ADJUSTABLE DOSE PRE-FILLED PEN SYRINGE | Refills: 2 | Status: SHIPPED | OUTPATIENT
Start: 2023-05-08

## 2023-05-08 RX ORDER — ERGOCALCIFEROL 1.25 MG/1
50000 CAPSULE ORAL WEEKLY
Qty: 12 CAPSULE | Refills: 4 | Status: SHIPPED | OUTPATIENT
Start: 2023-05-08 | End: 2024-05-08

## 2023-05-10 ENCOUNTER — TELEPHONE (OUTPATIENT)
Dept: ADMINISTRATIVE | Age: 43
End: 2023-05-10

## 2023-05-10 NOTE — TELEPHONE ENCOUNTER
Submitted PA for [facility]  Via CM (Key: [id number] STATUS: Prior Authorization duplicate/approved    Follow up done daily; if no response in three days we will refax for status check. If another three days goes by with no response we will call the insurance for status.
No

## 2023-05-22 ENCOUNTER — HOSPITAL ENCOUNTER (OUTPATIENT)
Dept: MAMMOGRAPHY | Age: 43
Discharge: HOME | End: 2023-05-27
Payer: COMMERCIAL

## 2023-05-22 VITALS — WEIGHT: 220 LBS | HEIGHT: 61 IN | BODY MASS INDEX: 41.54 KG/M2

## 2023-05-22 DIAGNOSIS — N63.0 BREAST MASS IN FEMALE: Primary | ICD-10-CM

## 2023-05-22 DIAGNOSIS — Z12.31 VISIT FOR SCREENING MAMMOGRAM: ICD-10-CM

## 2023-05-22 PROCEDURE — 77063 BREAST TOMOSYNTHESIS BI: CPT

## 2023-05-31 ENCOUNTER — HOSPITAL ENCOUNTER (OUTPATIENT)
Dept: ULTRASOUND IMAGING | Age: 43
Discharge: HOME | End: 2023-05-31
Payer: COMMERCIAL

## 2023-05-31 DIAGNOSIS — N63.0 BREAST MASS IN FEMALE: ICD-10-CM

## 2023-05-31 PROCEDURE — 76642 ULTRASOUND BREAST LIMITED: CPT

## 2023-08-22 DIAGNOSIS — N63.20 MASS OF LEFT BREAST, UNSPECIFIED QUADRANT: Primary | ICD-10-CM

## 2023-08-23 ENCOUNTER — TELEPHONE (OUTPATIENT)
Dept: PRIMARY CARE CLINIC | Age: 43
End: 2023-08-23

## 2023-08-23 NOTE — TELEPHONE ENCOUNTER
Called and talked to  pt about US. Pt says she is not scheduling the US. She is aware of lump. Pt states that lump is benign and that she is having a lumpectomy with Dr Mark Reece who she has been dealing with about this issue.    She also set up appointment with PCP for 30 Aug 23

## 2023-08-30 PROBLEM — N63.21 MASS OF UPPER OUTER QUADRANT OF LEFT BREAST: Status: ACTIVE | Noted: 2023-08-30

## 2023-08-31 ENCOUNTER — TELEPHONE (OUTPATIENT)
Dept: PRIMARY CARE CLINIC | Age: 43
End: 2023-08-31

## 2023-08-31 NOTE — TELEPHONE ENCOUNTER
Patient has office visit with pcp Rula Everett) and pcp placed an order for Xavier Mauricio LEFT patient & I Eliana Ann) called scheduling to make an appointment but was put into voicemail & left message; will f/u with patient today to make sure this appointment is made, per Dr Rula Everett

## 2023-09-20 ENCOUNTER — HOSPITAL ENCOUNTER (OUTPATIENT)
Dept: ULTRASOUND IMAGING | Age: 43
Discharge: HOME | End: 2023-09-20
Payer: COMMERCIAL

## 2023-09-20 ENCOUNTER — HOSPITAL ENCOUNTER (OUTPATIENT)
Dept: MAMMOGRAPHY | Age: 43
Discharge: HOME | End: 2023-09-20
Payer: COMMERCIAL

## 2023-09-20 DIAGNOSIS — N63.20 MASS OF LEFT BREAST, UNSPECIFIED QUADRANT: ICD-10-CM

## 2023-09-20 DIAGNOSIS — R92.8 ABNORMAL MAMMOGRAM: ICD-10-CM

## 2023-09-20 PROCEDURE — 77065 DX MAMMO INCL CAD UNI: CPT

## 2023-09-20 PROCEDURE — 19083 BX BREAST 1ST LESION US IMAG: CPT

## 2023-09-20 PROCEDURE — 88305 TISSUE EXAM BY PATHOLOGIST: CPT

## 2023-09-22 ENCOUNTER — TELEPHONE (OUTPATIENT)
Dept: PRIMARY CARE CLINIC | Age: 43
End: 2023-09-22

## 2023-09-22 NOTE — TELEPHONE ENCOUNTER
----- Message from Jose A Hudson sent at 9/21/2023 11:08 PM EDT -----  Regarding: Breast Biopsy   Contact: [phone]  Dr. Susie Jain saw your message about following up with a breast surgeon. I was called this morning from Dr. Ruth Valencia nurse and was told  my results were benign and was asked to return in 6 mos for a mammogram. I was told to follow up for a referral for a breast surgeon if the spot became painful or uncomfortable. I have no current issues with the spot so I will follow up in 6 mos for a mammogram. I will be scheduling a visit with you for routine bloodwork and refill on Monjaro or ozempic.  Thanks

## 2023-09-25 ENCOUNTER — TELEMEDICINE (OUTPATIENT)
Dept: PRIMARY CARE CLINIC | Age: 43
End: 2023-09-25
Payer: COMMERCIAL

## 2023-09-25 DIAGNOSIS — U07.1 COVID-19 VIRUS INFECTION: Primary | ICD-10-CM

## 2023-09-25 PROCEDURE — 99214 OFFICE O/P EST MOD 30 MIN: CPT | Performed by: INTERNAL MEDICINE

## 2023-09-25 RX ORDER — ACETAMINOPHEN 500 MG
500 TABLET ORAL EVERY 6 HOURS PRN
Qty: 30 TABLET | Refills: 0 | Status: SHIPPED | OUTPATIENT
Start: 2023-09-25

## 2023-09-25 ASSESSMENT — ENCOUNTER SYMPTOMS
DIARRHEA: 0
WHEEZING: 0
CHEST TIGHTNESS: 0
GASTROINTESTINAL NEGATIVE: 1
SINUS PRESSURE: 1
ABDOMINAL DISTENTION: 0
EYES NEGATIVE: 1
COUGH: 0
ABDOMINAL PAIN: 0
SHORTNESS OF BREATH: 0
RHINORRHEA: 1
SORE THROAT: 1
CONSTIPATION: 0

## 2023-09-25 NOTE — PROGRESS NOTES
applicable) is aware that this is a billable service, which includes applicable co-pays. This visit was conducted with the patient's (and/or legal guardian's) verbal consent. She has not had a related appointment within my department in the past 7 days or scheduled within the next 24 hours. The patient was located at Home: [address] [place] [address]. The provider was located at [facility] (Appt Dept): [address],  [address] [facility]. Naveen Connolly MD          Plan:      Jacob Read was seen today for positive for covid-19. Diagnoses and all orders for this visit:    COVID-19 virus infection  . Treat with antiviral . Stay hydrated . ES tylenol qid prn . If having trouble breathing , call office . Qurantine at least 5 days . Wear mask . -     nirmatrelvir/ritonavir 300/100 (PAXLOVID, 300/100,) 20 x 150 MG & 10 x 100MG TBPK; Take 3 tablets (two 150 mg nirmatrelvir and one 100 mg ritonavir tablets) by mouth every 12 hours for 5 days.               Naveen Connolly MD

## 2023-09-27 ENCOUNTER — TELEPHONE (OUTPATIENT)
Dept: PRIMARY CARE CLINIC | Age: 43
End: 2023-09-27

## 2023-09-27 ENCOUNTER — E-VISIT (OUTPATIENT)
Dept: PRIMARY CARE CLINIC | Age: 43
End: 2023-09-27
Payer: COMMERCIAL

## 2023-09-27 DIAGNOSIS — J01.90 ACUTE NON-RECURRENT SINUSITIS, UNSPECIFIED LOCATION: ICD-10-CM

## 2023-09-27 DIAGNOSIS — U07.1 COVID-19 VIRUS INFECTION: Primary | ICD-10-CM

## 2023-09-27 PROCEDURE — 99422 OL DIG E/M SVC 11-20 MIN: CPT | Performed by: INTERNAL MEDICINE

## 2023-09-27 RX ORDER — METHYLPREDNISOLONE 4 MG/1
TABLET ORAL
Qty: 1 KIT | Refills: 0 | Status: SHIPPED | OUTPATIENT
Start: 2023-09-27 | End: 2023-10-03

## 2023-09-27 RX ORDER — AMOXICILLIN AND CLAVULANATE POTASSIUM 875; 125 MG/1; MG/1
1 TABLET, FILM COATED ORAL 2 TIMES DAILY
Qty: 20 TABLET | Refills: 0 | Status: SHIPPED | OUTPATIENT
Start: 2023-09-27 | End: 2023-10-07

## 2023-09-27 ASSESSMENT — LIFESTYLE VARIABLES: SMOKING_STATUS: NO, I'VE NEVER SMOKED

## 2023-09-27 NOTE — E-VISIT ROUTING COMMENT
Medication for sinus congestion was  sent to the pharmacy. E visit level 2. Twenty minutes were spent on evaluation and treatment.

## 2023-09-27 NOTE — TELEPHONE ENCOUNTER
----- Message from Mariel Vidal sent at 9/27/2023  1:24 PM EDT -----  Subject: Medication Problem    Medication: Other - pax covid   Dosage: 300/100 am - pm 6 pills daily 5 days   Ordering Provider: jeannette    Question/Problem: pt feels this medication is not helping with the   congestion. pt woke up today with nose bleed pt feel shes needs an   antibiotics to help clear up what she feels is full sinuses infection pt   is requesting a Z- Pack and some steroids asap.  Please call pt with this   RX information       Pharmacy: [facility] [id number] - [id number], [address] - [place]   [phone] [facility] [phone]    ---------------------------------------------------------------------------  --------------  Reji STARR  [phone]; OK to leave message on voicemail,OK to respond with electronic   message via [employer] portal (only for patients who have registered [employer]   account)  ---------------------------------------------------------------------------  --------------    SCRIPT ANSWERS  Relationship to Patient: Self

## 2023-09-28 DIAGNOSIS — R73.09 ELEVATED HEMOGLOBIN A1C: ICD-10-CM

## 2023-09-28 NOTE — PROGRESS NOTES
You probably have a sinus infection. Please take the antibiotic that will be sent to the pharmacy as directed. Follow up with your primary care provider if symptoms continue. 20 minutes were spent on this E visit.

## 2023-09-29 RX ORDER — TIRZEPATIDE 5 MG/.5ML
INJECTION, SOLUTION SUBCUTANEOUS
Qty: 2 ML | Refills: 3 | Status: SHIPPED | OUTPATIENT
Start: 2023-09-29

## 2023-09-29 NOTE — TELEPHONE ENCOUNTER
Medication:   Requested Prescriptions     Pending Prescriptions Disp Refills    MOUNJARO 5 MG/0.5ML SOPN SC injection [Pharmacy Med Name: [place] 5 MG/0.5 ML PEN] 2 mL      Sig: INJECT 5 MG UNDER THE SKIN ONCE WEEKLY        Last Filled:      Patient Phone Number: [phone] (home)     Last appt: 9/27/2023   Next appt: Visit date not found    Last OARRS:        No data to display

## 2023-10-25 ENCOUNTER — OFFICE VISIT (OUTPATIENT)
Dept: PRIMARY CARE CLINIC | Age: 43
End: 2023-10-25
Payer: COMMERCIAL

## 2023-10-25 VITALS
BODY MASS INDEX: 44.63 KG/M2 | HEART RATE: 76 BPM | OXYGEN SATURATION: 98 % | WEIGHT: 236.2 LBS | DIASTOLIC BLOOD PRESSURE: 86 MMHG | SYSTOLIC BLOOD PRESSURE: 124 MMHG

## 2023-10-25 DIAGNOSIS — R73.03 PRE-DIABETES: ICD-10-CM

## 2023-10-25 DIAGNOSIS — R73.09 ELEVATED HEMOGLOBIN A1C: ICD-10-CM

## 2023-10-25 DIAGNOSIS — E55.9 VITAMIN D DEFICIENCY: ICD-10-CM

## 2023-10-25 DIAGNOSIS — R53.83 FATIGUE, UNSPECIFIED TYPE: ICD-10-CM

## 2023-10-25 DIAGNOSIS — E03.9 HYPOTHYROIDISM, UNSPECIFIED TYPE: Primary | ICD-10-CM

## 2023-10-25 DIAGNOSIS — E78.5 HYPERLIPIDEMIA, UNSPECIFIED HYPERLIPIDEMIA TYPE: ICD-10-CM

## 2023-10-25 PROCEDURE — 99213 OFFICE O/P EST LOW 20 MIN: CPT | Performed by: INTERNAL MEDICINE

## 2023-10-25 NOTE — PATIENT INSTRUCTIONS
Lab review as discussed today in our lab. Continue your current medications and see me again in 4 months.

## 2023-10-26 NOTE — PROGRESS NOTES
Brenda Kulkarni (:  1980) is a 44 y.o. female,Established patient, here for evaluation of the following chief complaint(s):  Check-Up (HLD Hypothyroid Vit D def pre dm iron def anemia labs to be done?)    Doing well. Follow up for labs. ASSESSMENT/PLAN:  1. Hypothyroidism, unspecified type  -     TSH with Reflex to FT4; Future  2. Hyperlipidemia, unspecified hyperlipidemia type  -     Lipid, Fasting; Future  3. Vitamin D deficiency  -     Vitamin D 25 Hydroxy; Future  4. Elevated hemoglobin A1c  -     Comprehensive Metabolic Panel; Future  5. Pre-diabetes  -     Comprehensive Metabolic Panel; Future  -     Hemoglobin A1C; Future  6. Fatigue, unspecified type  -     CBC with Auto Differential; Future  -     Ferritin; Future  -     Iron and TIBC; Future      No follow-ups on file. Subjective   SUBJECTIVE/OBJECTIVE:  [facility]    Review of Systems       Objective   Physical Exam            On this date 10/25/2023 I have spent 30 minutes reviewing previous notes, test results and face to face with the patient discussing the diagnosis and importance of compliance with the treatment plan as well as documenting on the day of the visit. An electronic signature was used to authenticate this note.     --Summer Hall MD

## 2023-11-15 ENCOUNTER — TELEPHONE (OUTPATIENT)
Dept: PRIMARY CARE CLINIC | Age: 43
End: 2023-11-15

## 2023-11-15 NOTE — TELEPHONE ENCOUNTER
Called patient and left message on machine informing her that paperwork is ready for  and she can pick them up at the

## 2025-05-16 ENCOUNTER — TELEPHONE (OUTPATIENT)
Dept: PRIMARY CARE CLINIC | Age: 45
End: 2025-05-16